# Patient Record
Sex: FEMALE | Race: WHITE | NOT HISPANIC OR LATINO | Employment: FULL TIME | URBAN - NONMETROPOLITAN AREA
[De-identification: names, ages, dates, MRNs, and addresses within clinical notes are randomized per-mention and may not be internally consistent; named-entity substitution may affect disease eponyms.]

---

## 2020-12-03 ENCOUNTER — OFFICE VISIT (OUTPATIENT)
Dept: PHYSICAL THERAPY | Facility: CLINIC | Age: 25
End: 2020-12-03
Payer: COMMERCIAL

## 2020-12-03 ENCOUNTER — TRANSCRIBE ORDERS (OUTPATIENT)
Dept: PHYSICAL THERAPY | Facility: CLINIC | Age: 25
End: 2020-12-03

## 2020-12-03 DIAGNOSIS — S62.326D CLOSED DISPLACED FRACTURE OF SHAFT OF FIFTH METACARPAL BONE OF RIGHT HAND WITH ROUTINE HEALING, SUBSEQUENT ENCOUNTER: Primary | ICD-10-CM

## 2020-12-03 PROCEDURE — L3808 WHFO, RIGID W/O JOINTS: HCPCS | Performed by: PHYSICAL THERAPIST

## 2020-12-07 ENCOUNTER — OFFICE VISIT (OUTPATIENT)
Dept: PHYSICAL THERAPY | Facility: CLINIC | Age: 25
End: 2020-12-07
Payer: COMMERCIAL

## 2020-12-07 DIAGNOSIS — S62.326D CLOSED DISPLACED FRACTURE OF SHAFT OF FIFTH METACARPAL BONE OF RIGHT HAND WITH ROUTINE HEALING, SUBSEQUENT ENCOUNTER: Primary | ICD-10-CM

## 2020-12-07 PROCEDURE — 97014 ELECTRIC STIMULATION THERAPY: CPT | Performed by: PHYSICAL THERAPIST

## 2020-12-07 PROCEDURE — 97140 MANUAL THERAPY 1/> REGIONS: CPT | Performed by: PHYSICAL THERAPIST

## 2020-12-11 ENCOUNTER — OFFICE VISIT (OUTPATIENT)
Dept: PHYSICAL THERAPY | Facility: CLINIC | Age: 25
End: 2020-12-11
Payer: COMMERCIAL

## 2020-12-11 DIAGNOSIS — S62.326D CLOSED DISPLACED FRACTURE OF SHAFT OF FIFTH METACARPAL BONE OF RIGHT HAND WITH ROUTINE HEALING, SUBSEQUENT ENCOUNTER: Primary | ICD-10-CM

## 2020-12-11 PROCEDURE — 97112 NEUROMUSCULAR REEDUCATION: CPT

## 2020-12-11 PROCEDURE — 97535 SELF CARE MNGMENT TRAINING: CPT

## 2020-12-11 PROCEDURE — 97140 MANUAL THERAPY 1/> REGIONS: CPT

## 2020-12-11 PROCEDURE — 97035 APP MDLTY 1+ULTRASOUND EA 15: CPT

## 2020-12-14 ENCOUNTER — OFFICE VISIT (OUTPATIENT)
Dept: PHYSICAL THERAPY | Facility: CLINIC | Age: 25
End: 2020-12-14
Payer: COMMERCIAL

## 2020-12-14 DIAGNOSIS — S62.326D CLOSED DISPLACED FRACTURE OF SHAFT OF FIFTH METACARPAL BONE OF RIGHT HAND WITH ROUTINE HEALING, SUBSEQUENT ENCOUNTER: Primary | ICD-10-CM

## 2020-12-14 PROCEDURE — 97110 THERAPEUTIC EXERCISES: CPT | Performed by: PHYSICAL THERAPIST

## 2020-12-14 PROCEDURE — 97035 APP MDLTY 1+ULTRASOUND EA 15: CPT | Performed by: PHYSICAL THERAPIST

## 2020-12-14 PROCEDURE — 97140 MANUAL THERAPY 1/> REGIONS: CPT | Performed by: PHYSICAL THERAPIST

## 2020-12-14 PROCEDURE — 97112 NEUROMUSCULAR REEDUCATION: CPT | Performed by: PHYSICAL THERAPIST

## 2020-12-17 ENCOUNTER — APPOINTMENT (OUTPATIENT)
Dept: PHYSICAL THERAPY | Facility: CLINIC | Age: 25
End: 2020-12-17
Payer: COMMERCIAL

## 2020-12-18 ENCOUNTER — OFFICE VISIT (OUTPATIENT)
Dept: PHYSICAL THERAPY | Facility: CLINIC | Age: 25
End: 2020-12-18
Payer: COMMERCIAL

## 2020-12-18 DIAGNOSIS — S62.326D CLOSED DISPLACED FRACTURE OF SHAFT OF FIFTH METACARPAL BONE OF RIGHT HAND WITH ROUTINE HEALING, SUBSEQUENT ENCOUNTER: Primary | ICD-10-CM

## 2020-12-18 PROCEDURE — 97110 THERAPEUTIC EXERCISES: CPT | Performed by: PHYSICAL THERAPIST

## 2020-12-18 PROCEDURE — 97112 NEUROMUSCULAR REEDUCATION: CPT | Performed by: PHYSICAL THERAPIST

## 2020-12-18 PROCEDURE — 97035 APP MDLTY 1+ULTRASOUND EA 15: CPT | Performed by: PHYSICAL THERAPIST

## 2020-12-18 PROCEDURE — 97140 MANUAL THERAPY 1/> REGIONS: CPT | Performed by: PHYSICAL THERAPIST

## 2020-12-21 ENCOUNTER — TRANSCRIBE ORDERS (OUTPATIENT)
Dept: PHYSICAL THERAPY | Facility: CLINIC | Age: 25
End: 2020-12-21

## 2020-12-21 ENCOUNTER — OFFICE VISIT (OUTPATIENT)
Dept: PHYSICAL THERAPY | Facility: CLINIC | Age: 25
End: 2020-12-21
Payer: COMMERCIAL

## 2020-12-21 DIAGNOSIS — S62.326D CLOSED DISPLACED FRACTURE OF SHAFT OF FIFTH METACARPAL BONE OF RIGHT HAND WITH ROUTINE HEALING, SUBSEQUENT ENCOUNTER: Primary | ICD-10-CM

## 2020-12-21 DIAGNOSIS — S62.326D DISPLACED FRACTURE OF SHAFT OF FIFTH METACARPAL BONE, RIGHT HAND, SUBSEQUENT ENCOUNTER FOR FRACTURE WITH ROUTINE HEALING: ICD-10-CM

## 2020-12-21 PROCEDURE — 97112 NEUROMUSCULAR REEDUCATION: CPT | Performed by: PHYSICAL THERAPIST

## 2020-12-21 PROCEDURE — 97140 MANUAL THERAPY 1/> REGIONS: CPT | Performed by: PHYSICAL THERAPIST

## 2020-12-21 PROCEDURE — 97035 APP MDLTY 1+ULTRASOUND EA 15: CPT | Performed by: PHYSICAL THERAPIST

## 2020-12-21 PROCEDURE — 97110 THERAPEUTIC EXERCISES: CPT | Performed by: PHYSICAL THERAPIST

## 2020-12-24 ENCOUNTER — OFFICE VISIT (OUTPATIENT)
Dept: PHYSICAL THERAPY | Facility: CLINIC | Age: 25
End: 2020-12-24
Payer: COMMERCIAL

## 2020-12-24 DIAGNOSIS — S62.326D CLOSED DISPLACED FRACTURE OF SHAFT OF FIFTH METACARPAL BONE OF RIGHT HAND WITH ROUTINE HEALING, SUBSEQUENT ENCOUNTER: Primary | ICD-10-CM

## 2020-12-24 PROCEDURE — 97035 APP MDLTY 1+ULTRASOUND EA 15: CPT | Performed by: PHYSICAL THERAPIST

## 2020-12-24 PROCEDURE — 97140 MANUAL THERAPY 1/> REGIONS: CPT | Performed by: PHYSICAL THERAPIST

## 2020-12-24 PROCEDURE — 97110 THERAPEUTIC EXERCISES: CPT | Performed by: PHYSICAL THERAPIST

## 2020-12-24 PROCEDURE — 97112 NEUROMUSCULAR REEDUCATION: CPT | Performed by: PHYSICAL THERAPIST

## 2020-12-28 ENCOUNTER — APPOINTMENT (OUTPATIENT)
Dept: PHYSICAL THERAPY | Facility: CLINIC | Age: 25
End: 2020-12-28
Payer: COMMERCIAL

## 2020-12-29 ENCOUNTER — OFFICE VISIT (OUTPATIENT)
Dept: PHYSICAL THERAPY | Facility: CLINIC | Age: 25
End: 2020-12-29
Payer: COMMERCIAL

## 2020-12-29 DIAGNOSIS — S62.326D CLOSED DISPLACED FRACTURE OF SHAFT OF FIFTH METACARPAL BONE OF RIGHT HAND WITH ROUTINE HEALING, SUBSEQUENT ENCOUNTER: Primary | ICD-10-CM

## 2020-12-29 PROCEDURE — 97140 MANUAL THERAPY 1/> REGIONS: CPT

## 2020-12-29 PROCEDURE — 97112 NEUROMUSCULAR REEDUCATION: CPT

## 2020-12-29 PROCEDURE — 97035 APP MDLTY 1+ULTRASOUND EA 15: CPT

## 2020-12-29 PROCEDURE — 97535 SELF CARE MNGMENT TRAINING: CPT

## 2020-12-31 ENCOUNTER — OFFICE VISIT (OUTPATIENT)
Dept: PHYSICAL THERAPY | Facility: CLINIC | Age: 25
End: 2020-12-31
Payer: COMMERCIAL

## 2020-12-31 DIAGNOSIS — S62.326D CLOSED DISPLACED FRACTURE OF SHAFT OF FIFTH METACARPAL BONE OF RIGHT HAND WITH ROUTINE HEALING, SUBSEQUENT ENCOUNTER: Primary | ICD-10-CM

## 2020-12-31 PROCEDURE — 97112 NEUROMUSCULAR REEDUCATION: CPT | Performed by: PHYSICAL THERAPIST

## 2020-12-31 PROCEDURE — 97110 THERAPEUTIC EXERCISES: CPT | Performed by: PHYSICAL THERAPIST

## 2020-12-31 PROCEDURE — 97140 MANUAL THERAPY 1/> REGIONS: CPT | Performed by: PHYSICAL THERAPIST

## 2020-12-31 PROCEDURE — 97535 SELF CARE MNGMENT TRAINING: CPT | Performed by: PHYSICAL THERAPIST

## 2021-01-04 ENCOUNTER — OFFICE VISIT (OUTPATIENT)
Dept: PHYSICAL THERAPY | Facility: CLINIC | Age: 26
End: 2021-01-04
Payer: COMMERCIAL

## 2021-01-04 DIAGNOSIS — S62.326D CLOSED DISPLACED FRACTURE OF SHAFT OF FIFTH METACARPAL BONE OF RIGHT HAND WITH ROUTINE HEALING, SUBSEQUENT ENCOUNTER: Primary | ICD-10-CM

## 2021-01-04 PROCEDURE — 97035 APP MDLTY 1+ULTRASOUND EA 15: CPT

## 2021-01-04 PROCEDURE — 97110 THERAPEUTIC EXERCISES: CPT

## 2021-01-04 PROCEDURE — 97140 MANUAL THERAPY 1/> REGIONS: CPT

## 2021-01-04 PROCEDURE — 97112 NEUROMUSCULAR REEDUCATION: CPT

## 2021-01-04 NOTE — PROGRESS NOTES
Daily Note     Today's date: 2021  Patient name: Pedro Pablo Leger  : 1995  MRN: [id number]  Referring provider: Liz Brewer MD  Dx:   Encounter Diagnosis     ICD-10-CM    1  Closed displaced fracture of shaft of fifth metacarpal bone of right hand with routine healing, subsequent encounter  [id number] [id number]                   Subjective: Pt reports her hand is feeling better since she starting doing activities  Objective: See treatment diary below  Wrist/Hand Comments  AROM right wrist E/F- 70/65                      SF- MP- 0/30; PIP- 0/80; DIP-0/70                      RF MP- 0/75; PIP- 0/100; DIP- 0/80  Strength- un-assessed  Circumference at MCP- 19 2 cm; RF- P1- 5 1 cm; SF- P1- 5 0 cm  Sensation- intact to LT  Inspection- healing incision dorsal hand; moderate swelling  Pt wearing custom [facility] for 5th MC fracture      Assessment: Tolerated treatment well today  Abbreviated program today due to time restraint       Plan: Continue with current program      Precautions: wear orthosis for protection    Manuals 12/3 12/7 12/11 12/14 12/18  12/21  12/23  12/29  12/31  1/4   STM --- 15 15 15 15  15  15  15 15  15                           Mepiform/  tubigrip              4                             Neuro Re-Ed                       HP/pulsed biph     15 15 15  15  15  15 15  15                                                   Ther Ex                       TGE ----    3x 3x 3x  3x  3x  3x  3x 3x   Twister (no resist)       20/20 20/20  20/20  20/20  20/20 20/20  20/20    TP                   T 2'  NV    TP 5 finger                  T 2/10  NV    Gino                  10 2/10  10 2/10    Blue                  III 2/10      DB E/F                  --->  ---->          S/P         ----> ----->   Flex bar         Y /10 Y 2/10                                                                                            Ther Activity                                                                                                                                               Modalities                                               US  DC 8 12 12 12  12  12  12  dc  12   CP 15 15 No time 12 12  12  12  12 [place]

## 2021-01-07 ENCOUNTER — OFFICE VISIT (OUTPATIENT)
Dept: PHYSICAL THERAPY | Facility: CLINIC | Age: 26
End: 2021-01-07
Payer: COMMERCIAL

## 2021-01-07 DIAGNOSIS — S62.326D CLOSED DISPLACED FRACTURE OF SHAFT OF FIFTH METACARPAL BONE OF RIGHT HAND WITH ROUTINE HEALING, SUBSEQUENT ENCOUNTER: Primary | ICD-10-CM

## 2021-01-07 PROCEDURE — 97112 NEUROMUSCULAR REEDUCATION: CPT | Performed by: PHYSICAL THERAPIST

## 2021-01-07 PROCEDURE — 97110 THERAPEUTIC EXERCISES: CPT | Performed by: PHYSICAL THERAPIST

## 2021-01-07 PROCEDURE — 97140 MANUAL THERAPY 1/> REGIONS: CPT | Performed by: PHYSICAL THERAPIST

## 2021-01-07 PROCEDURE — 97035 APP MDLTY 1+ULTRASOUND EA 15: CPT | Performed by: PHYSICAL THERAPIST

## 2021-01-07 NOTE — PROGRESS NOTES
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, currently working  Dominant- right; Involved- right  Pt is progressing with AROM and cont wearing her orthosis for protection  She is now 6 weeks post surgery  Pt was seen by her physician and will continue therapy at home    Goals  ST  Decrease pain to 3-4 in 4 weeks            2  Decrease swelling right hand            3  Increase AROM to composite fist and extension            4   Provide orthotic for protection  LT  Increase functional motion and strength for independence with ADL and self care by DC            2  Ability to RTW full duty and RT recreational activity by DC  Goals met    Plan  Patient would benefit from: skilled physical therapy and custom splinting  Planned modality interventions: thermotherapy: hydrocollator packs, cryotherapy and ultrasound  Planned therapy interventions: massage, neuromuscular re-education, stretching, strengthening, therapeutic activities, therapeutic exercise and home exercise program  Frequency: 1x week  Duration in weeks: 4  Treatment plan discussed with: patient        Subjective Evaluation    History of Present Illness  Date of onset: 2020  Date of surgery: 2020  Mechanism of injury: Pt fell 20   ORIF for stabilization of 5th MC shaft right hand 20  Pain  Current pain ratin  At best pain ratin  At worst pain rating: 1  Location: right hand    Hand dominance: right    Treatments  Current treatment: physical therapy  Patient Goals  Patient goals for therapy: decreased edema, decreased pain, increased motion, increased strength, independence with ADLs/IADLs and return to sport/leisure activities          Objective     General Comments:      Wrist/Hand Comments  AROM right wrist E/F- 70/65                      SF- MP- 0/90; PIP- 0/105; DIP-0/90                      RF MP- 0/90; PIP- 0/100; DIP- 0/80  Strength-  II- 50/70; 3 JES- 10/14; Key- 11/12  Circumference at MCP- 19 1 cm; RF- P1- 5 1 cm; SF- P1- 5 0 cm  Sensation- intact to LT  Inspection- healing incision dorsal hand; mild swelling              Precautions: wear orthosis for protection    Manuals 1/7            STM 15 15 15 15 15  15  15  15 15  15                           Mepiform/  tubigrip                  4                             Neuro Re-Ed                       HP/pulsed biph  15   15 15 15  15  15  15 15  15                                                   Ther Ex                       TGE ----    3x 3x 3x  3x  3x  3x  3x 3x   Twister  20/20     20/20 20/20  20/20  20/20  20/20 20/20  20/20    TP   T 2'                T 2'  NV    TP 5 finger  T 2/10                T 2/10  NV    Gino  20 2/10  35              10 2/10  10 2/10    Blue  III 2/10  IV              III 2/10  III 2/10    DB E/F  3 2/10  4              --->  ---->          S/P  1 2/10               ----> ----->   Flex bar  Y 2/10               Y 2/10 Y 2/10    Jesus's    4                                                                                                                                           Ther Activity                                                                                                                                               Modalities                                                            CP 15 15 ---> 12 12  12  12  12  12  12

## 2021-01-07 NOTE — LETTER
2021    MD Jacey Parker [address] [place] [id number]    Patient: Frances Scott   YOB: 1995   Date of Visit: 2021     Encounter Diagnosis     ICD-10-CM    1  Closed displaced fracture of shaft of fifth metacarpal bone of right hand with routine healing, subsequent encounter  S62 326D        Dear Dr David Guaman:    Thank you for your recent referral of Frances Scott  Please review the attached evaluation summary from [facility] recent visit  Please verify that you agree with the plan of care by signing the attached order  If you have any questions or concerns, please do not hesitate to call  I sincerely appreciate the opportunity to share in the care of one of your patients and hope to have another opportunity to work with you in the near future  Sincerely,    Chino Curtis, DPT, CHT    Referring Provider:      I certify that I have read the below Plan of Care and certify the need for these services furnished under this plan of treatment while under my care  MD Jacey Parker [address] [place] [id number]  Via Fax: [phone]          PT Re-Evaluation     Today's date: 2021  Patient name: Frances Scott  : 1995  MRN: [id number]  Referring provider: Dahlia Knight MD  Dx:   Encounter Diagnosis     ICD-10-CM    1  Closed displaced fracture of shaft of fifth metacarpal bone of right hand with routine healing, subsequent encounter  S65 950D                   Assessment  Assessment details: Pt is a 29 YO female presenting to PT with pain, decreased AROM, strength and tolerance to activity  Pt would benefit from skilled intervention to address these issues and maximize overall function  Occupation- gym- , currently working  Dominant- right; Involved- right  Pt is progressing with AROM and cont wearing her orthosis for protection  She is now 6 weeks post surgery    Goals  ST    Decrease pain to 3-4 in 4 weeks            2  Decrease swelling right hand            3  Increase AROM to composite fist and extension            4   Provide orthotic for protection  LT  Increase functional motion and strength for independence with ADL and self care by DC            2  Ability to RTW full duty and RT recreational activity by DC    Plan  Patient would benefit from: skilled physical therapy and custom splinting  Planned modality interventions: thermotherapy: hydrocollator packs, cryotherapy and ultrasound  Planned therapy interventions: massage, neuromuscular re-education, stretching, strengthening, therapeutic activities, therapeutic exercise and home exercise program  Frequency: 1x week  Duration in weeks: 4  Treatment plan discussed with: patient        Subjective Evaluation    History of Present Illness  Date of onset: 2020  Date of surgery: 2020  Mechanism of injury: Pt fell 20   ORIF for stabilization of 5th MC shaft right hand 20  Pain  Current pain ratin  At best pain ratin  At worst pain ratin  Location: right hand    Hand dominance: right    Treatments  Current treatment: physical therapy  Patient Goals  Patient goals for therapy: decreased edema, decreased pain, increased motion, increased strength, independence with ADLs/IADLs and return to sport/leisure activities          Objective     General Comments:      Wrist/Hand Comments  AROM right wrist E/F- 70/65                      SF- MP- 0/90; PIP- 0/105; DIP-0/90                      RF MP- 0/90; PIP- 0/100; DIP- 0/80  Strength-  II- 50/70; 3 JES- 10/14; Key-   Circumference at MCP- 19 1 cm; RF- P1- 5 1 cm; SF- P1- 5 0 cm  Sensation- intact to LT  Inspection- healing incision dorsal hand; mild swelling              Precautions: wear orthosis for protection    Manuals             STM 15 15 15 15 15  15  15  15 15  15                           Mepiform/  tubigrip                  4                             Neuro Re-Ed                       HP/pulsed biph  15   15 15 15  15  15  15 15  15                                                   Ther Ex                       TGE ----    3x 3x 3x  3x  3x  3x  3x 3x   Twister  20/20     20/20 20/20  20/20  20/20  20/20 20/20  20/20    TP   T 2'                T 2'  NV    TP 5 finger  T 2/10                T 2/10  NV    Gino  20 2/10  35              10 2/10  10 2/10    Blue  III 2/10  IV              III 2/10  III 2/10    DB E/F  3 2/10  4              --->  ---->          S/P  1 2/10               ----> ----->   Flex bar  Y 2/10               Y 2/10 Y 2/10    Jesus's    4                                                                                                                                           Ther Activity                                                                                                                                               Modalities                                                            CP 15 15 ---> 12 12  12  12  12 12  12

## 2021-01-11 ENCOUNTER — APPOINTMENT (OUTPATIENT)
Dept: PHYSICAL THERAPY | Facility: CLINIC | Age: 26
End: 2021-01-11
Payer: COMMERCIAL

## 2021-01-14 ENCOUNTER — APPOINTMENT (OUTPATIENT)
Dept: PHYSICAL THERAPY | Facility: CLINIC | Age: 26
End: 2021-01-14
Payer: COMMERCIAL